# Patient Record
Sex: MALE | Race: OTHER | NOT HISPANIC OR LATINO | URBAN - METROPOLITAN AREA
[De-identification: names, ages, dates, MRNs, and addresses within clinical notes are randomized per-mention and may not be internally consistent; named-entity substitution may affect disease eponyms.]

---

## 2017-09-23 ENCOUNTER — EMERGENCY (EMERGENCY)
Facility: HOSPITAL | Age: 62
LOS: 1 days | Discharge: HOME | End: 2017-09-23
Attending: EMERGENCY MEDICINE | Admitting: EMERGENCY MEDICINE
Payer: SELF-PAY

## 2017-09-23 VITALS
OXYGEN SATURATION: 94 % | WEIGHT: 197.09 LBS | HEART RATE: 106 BPM | HEIGHT: 68 IN | RESPIRATION RATE: 20 BRPM | TEMPERATURE: 98 F | SYSTOLIC BLOOD PRESSURE: 147 MMHG | DIASTOLIC BLOOD PRESSURE: 89 MMHG

## 2017-09-23 VITALS
TEMPERATURE: 97 F | OXYGEN SATURATION: 95 % | HEART RATE: 101 BPM | DIASTOLIC BLOOD PRESSURE: 77 MMHG | RESPIRATION RATE: 18 BRPM | SYSTOLIC BLOOD PRESSURE: 132 MMHG

## 2017-09-23 LAB
ALBUMIN SERPL ELPH-MCNC: 4 G/DL — SIGNIFICANT CHANGE UP (ref 3.3–5.2)
ALP SERPL-CCNC: 106 U/L — SIGNIFICANT CHANGE UP (ref 40–120)
ALT FLD-CCNC: 25 U/L — SIGNIFICANT CHANGE UP
ANION GAP SERPL CALC-SCNC: 13 MMOL/L — SIGNIFICANT CHANGE UP (ref 5–17)
AST SERPL-CCNC: 24 U/L — SIGNIFICANT CHANGE UP
BILIRUB SERPL-MCNC: 0.6 MG/DL — SIGNIFICANT CHANGE UP (ref 0.4–2)
BUN SERPL-MCNC: 16 MG/DL — SIGNIFICANT CHANGE UP (ref 8–20)
CALCIUM SERPL-MCNC: 9.7 MG/DL — SIGNIFICANT CHANGE UP (ref 8.6–10.2)
CHLORIDE SERPL-SCNC: 99 MMOL/L — SIGNIFICANT CHANGE UP (ref 98–107)
CO2 SERPL-SCNC: 27 MMOL/L — SIGNIFICANT CHANGE UP (ref 22–29)
CREAT SERPL-MCNC: 0.94 MG/DL — SIGNIFICANT CHANGE UP (ref 0.5–1.3)
D DIMER BLD IA.RAPID-MCNC: 176 NG/ML DDU — SIGNIFICANT CHANGE UP
GLUCOSE SERPL-MCNC: 118 MG/DL — HIGH (ref 70–115)
HCT VFR BLD CALC: 44 % — SIGNIFICANT CHANGE UP (ref 42–52)
HGB BLD-MCNC: 15.7 G/DL — SIGNIFICANT CHANGE UP (ref 14–18)
MCHC RBC-ENTMCNC: 30.6 PG — SIGNIFICANT CHANGE UP (ref 27–31)
MCHC RBC-ENTMCNC: 35.7 G/DL — SIGNIFICANT CHANGE UP (ref 32–36)
MCV RBC AUTO: 85.8 FL — SIGNIFICANT CHANGE UP (ref 80–94)
PLATELET # BLD AUTO: 290 K/UL — SIGNIFICANT CHANGE UP (ref 150–400)
POTASSIUM SERPL-MCNC: 4 MMOL/L — SIGNIFICANT CHANGE UP (ref 3.5–5.3)
POTASSIUM SERPL-SCNC: 4 MMOL/L — SIGNIFICANT CHANGE UP (ref 3.5–5.3)
PROT SERPL-MCNC: 7.5 G/DL — SIGNIFICANT CHANGE UP (ref 6.6–8.7)
RBC # BLD: 5.13 M/UL — SIGNIFICANT CHANGE UP (ref 4.6–6.2)
RBC # FLD: 12.5 % — SIGNIFICANT CHANGE UP (ref 11–15.6)
SODIUM SERPL-SCNC: 139 MMOL/L — SIGNIFICANT CHANGE UP (ref 135–145)
WBC # BLD: 10.8 K/UL — SIGNIFICANT CHANGE UP (ref 4.8–10.8)
WBC # FLD AUTO: 10.8 K/UL — SIGNIFICANT CHANGE UP (ref 4.8–10.8)

## 2017-09-23 PROCEDURE — 85027 COMPLETE CBC AUTOMATED: CPT

## 2017-09-23 PROCEDURE — 99284 EMERGENCY DEPT VISIT MOD MDM: CPT | Mod: 25

## 2017-09-23 PROCEDURE — 96375 TX/PRO/DX INJ NEW DRUG ADDON: CPT

## 2017-09-23 PROCEDURE — 80053 COMPREHEN METABOLIC PANEL: CPT

## 2017-09-23 PROCEDURE — 93005 ELECTROCARDIOGRAM TRACING: CPT

## 2017-09-23 PROCEDURE — 93010 ELECTROCARDIOGRAM REPORT: CPT

## 2017-09-23 PROCEDURE — 99284 EMERGENCY DEPT VISIT MOD MDM: CPT

## 2017-09-23 PROCEDURE — 94640 AIRWAY INHALATION TREATMENT: CPT

## 2017-09-23 PROCEDURE — 71250 CT THORAX DX C-: CPT | Mod: 26

## 2017-09-23 PROCEDURE — 36415 COLL VENOUS BLD VENIPUNCTURE: CPT

## 2017-09-23 PROCEDURE — 96374 THER/PROPH/DIAG INJ IV PUSH: CPT

## 2017-09-23 PROCEDURE — 71250 CT THORAX DX C-: CPT

## 2017-09-23 PROCEDURE — 85379 FIBRIN DEGRADATION QUANT: CPT

## 2017-09-23 RX ORDER — ALBUTEROL 90 UG/1
2.5 AEROSOL, METERED ORAL ONCE
Qty: 0 | Refills: 0 | Status: COMPLETED | OUTPATIENT
Start: 2017-09-23 | End: 2017-09-23

## 2017-09-23 RX ORDER — SODIUM CHLORIDE 9 MG/ML
1000 INJECTION INTRAMUSCULAR; INTRAVENOUS; SUBCUTANEOUS ONCE
Qty: 0 | Refills: 0 | Status: COMPLETED | OUTPATIENT
Start: 2017-09-23 | End: 2017-09-23

## 2017-09-23 RX ORDER — AZITHROMYCIN 500 MG/1
500 TABLET, FILM COATED ORAL ONCE
Qty: 0 | Refills: 0 | Status: COMPLETED | OUTPATIENT
Start: 2017-09-23 | End: 2017-09-23

## 2017-09-23 RX ORDER — IPRATROPIUM/ALBUTEROL SULFATE 18-103MCG
3 AEROSOL WITH ADAPTER (GRAM) INHALATION ONCE
Qty: 0 | Refills: 0 | Status: COMPLETED | OUTPATIENT
Start: 2017-09-23 | End: 2017-09-23

## 2017-09-23 RX ORDER — AZITHROMYCIN 500 MG/1
1 TABLET, FILM COATED ORAL
Qty: 4 | Refills: 0 | OUTPATIENT
Start: 2017-09-23 | End: 2017-09-27

## 2017-09-23 RX ORDER — CEFTRIAXONE 500 MG/1
1 INJECTION, POWDER, FOR SOLUTION INTRAMUSCULAR; INTRAVENOUS ONCE
Qty: 0 | Refills: 0 | Status: COMPLETED | OUTPATIENT
Start: 2017-09-23 | End: 2017-09-23

## 2017-09-23 RX ADMIN — Medication 40 MILLIGRAM(S): at 21:06

## 2017-09-23 RX ADMIN — SODIUM CHLORIDE 1000 MILLILITER(S): 9 INJECTION INTRAMUSCULAR; INTRAVENOUS; SUBCUTANEOUS at 16:32

## 2017-09-23 RX ADMIN — ALBUTEROL 2.5 MILLIGRAM(S): 90 AEROSOL, METERED ORAL at 17:28

## 2017-09-23 RX ADMIN — Medication 3 MILLILITER(S): at 16:32

## 2017-09-23 RX ADMIN — Medication 125 MILLIGRAM(S): at 16:32

## 2017-09-23 RX ADMIN — Medication 3 MILLILITER(S): at 17:28

## 2017-09-23 RX ADMIN — AZITHROMYCIN 500 MILLIGRAM(S): 500 TABLET, FILM COATED ORAL at 21:06

## 2017-09-23 RX ADMIN — CEFTRIAXONE 100 GRAM(S): 500 INJECTION, POWDER, FOR SOLUTION INTRAMUSCULAR; INTRAVENOUS at 21:07

## 2017-09-23 NOTE — PHARMACY COMMUNICATION NOTE - COMMENTS
Spoke with PA regarding previous dose of solu-medrol being given and now a prednisone dose being given as well. PA knows about the solu-medrol dose and wants to give prednisone as well.

## 2017-09-23 NOTE — ED STATDOCS - PROGRESS NOTE DETAILS
PA NOTE: Pt seen by intake physician and hpi/orders/plan reviewed. PT presenting to ED with complaints of cough and SOB x 3 weeks.  Patient took 1 week of levaquin with no relief.  Denies fever  PE: GEN: Awake, alert,  NAD,  EYES: PERRL RESP: No distress noted. Lungs + wheezes in left lung ABD: soft,  non-tender, no guarding. . NEURO: AOx3, no focal deficits   PLAN: nebs, labs, CT pt feels better after nebs, lungs CTA sign out received. ct and labs reviewed. offered pt admission but he denied admission due to financial reasons. advised pt he can still be admitted. pt reports he would like to try out pt treatment and f/u with [facility]. will give IV rocephin 1g in ed with 500mg azithromycin PO and prednisone. will rx azithromycin and prednisone. advised pt to continue albuterol inhaler. pt verbalized understanding and agreement with plan and dx will dc

## 2017-09-23 NOTE — ED STATDOCS - ATTENDING CONTRIBUTION TO CARE
I, Idalia Watson, performed the initial face to face bedside interview with this patient regarding history of present illness, review of symptoms and relevant past medical, social and family history.  I completed an independent physical examination.  I was the initial provider who evaluated this patient. I have signed out the follow up of any pending tests (i.e. labs, radiological studies) to the ACP.  I have communicated the patient’s plan of care and disposition with the ACP.

## 2017-09-23 NOTE — ED STATDOCS - NS ED PATIENT SAFETY CONCERN
8/14/2017      Melva Hillman  [address] [facility] [address]      RE:  Appointment      Dear Melva,     We have made numerous attempts to contact you via the telephone regarding scheduling an appointment.    At your earliest convenience, please contact the clinic so that we may discuss your availability.    Sincerely,          Referral Specialist  [phone]    [address] [facility] [address] [phone]    
No

## 2017-09-23 NOTE — ED STATDOCS - MEDICAL DECISION MAKING DETAILS
62 year old male with PMHx pneumonia, scoliosis presents to ED c/o intermittent cough starting x1 week ago s/p diagnoses of pneumonia. On exam pt is wheezing. Will r/o post pneumonic bronchospasms vs continued pneumonia. Will treat with steroids. Plan to obtain CBC, CMP, D-Dimer labs and may need to scan patient. 62 year old male with PMHx pneumonia, scoliosis presents to ED c/o intermittent cough starting x1 week ago s/p diagnoses of pneumonia. On exam pt is wheezing. Will r/o post pneumonic bronchospasms vs continued pneumonia. Will give Albuterol treatment via nebulizer. Plan to obtain CBC, CMP, D-Dimer labs and may need to scan patient.

## 2017-09-23 NOTE — ED ADULT TRIAGE NOTE - CHIEF COMPLAINT QUOTE
sent from urgent care; had pneumonia treated with 1 week antibiotics just completed still reporting cough/congestion/intermittent sob. afebrile.

## 2017-09-23 NOTE — ED STATDOCS - OBJECTIVE STATEMENT
62 year old male with PMHx pneumonia, scoliosis presents to ED c/o intermittent cough starting x1 week ago s/p diagnoses of pneumonia. Pt had a CXR completed with positive findings of pneumonia that has improved but is still symptomatic, according to urgent care. He is having insomnia, CP when coughing. Pt states he has episodes of coughing for 15-20 minutes each episode. Denies fever. No further complaints at this time.

## 2017-11-15 NOTE — ED ADULT NURSE NOTE - CAS ELECT INFOMATION PROVIDED
Our patient satisfaction survey has a top rating of \"10.\"     It is always our goal to provide you with exceptional care.  We appreciate the time you take to give us feedback.    We hope you will recommend us to all your family and friends.    Your [facility] Care Team    If you have questions about your care,   Please call the clinic manager at [phone].     Thank you,       Nataly Llanos and Hina          
DC instructions